# Patient Record
Sex: MALE | Race: WHITE | NOT HISPANIC OR LATINO
[De-identification: names, ages, dates, MRNs, and addresses within clinical notes are randomized per-mention and may not be internally consistent; named-entity substitution may affect disease eponyms.]

---

## 2021-12-16 ENCOUNTER — APPOINTMENT (OUTPATIENT)
Dept: FAMILY MEDICINE | Facility: CLINIC | Age: 56
End: 2021-12-16
Payer: COMMERCIAL

## 2021-12-16 ENCOUNTER — NON-APPOINTMENT (OUTPATIENT)
Age: 56
End: 2021-12-16

## 2021-12-16 VITALS
HEART RATE: 71 BPM | DIASTOLIC BLOOD PRESSURE: 110 MMHG | WEIGHT: 196 LBS | SYSTOLIC BLOOD PRESSURE: 190 MMHG | BODY MASS INDEX: 28.06 KG/M2 | TEMPERATURE: 97.6 F | HEIGHT: 70 IN | OXYGEN SATURATION: 97 %

## 2021-12-16 DIAGNOSIS — E55.9 VITAMIN D DEFICIENCY, UNSPECIFIED: ICD-10-CM

## 2021-12-16 PROCEDURE — 36415 COLL VENOUS BLD VENIPUNCTURE: CPT

## 2021-12-16 PROCEDURE — 99386 PREV VISIT NEW AGE 40-64: CPT | Mod: 25

## 2021-12-17 NOTE — HISTORY OF PRESENT ILLNESS
[de-identified] : 56-year-old male with history of hypertension, vitamin D, presents today to establish care as a new patient\par \par HTN 15 yrs ago - 2004 went into this in 2 months - under extreme stress \par - describes anxiety chest pains \par - 3rd degree - \par - intentional weight loss 13 pounds - triglycerides and increased uric  acid \par -  improved job situation \par noted to be job related stress \par patient has proven to have issues with \par nifedipine- went emergency for sob, pain up legs and change in coloration- further went up \par - diagnosed wit h anaphalaxis \par - multiple specialists were seen- patient unaware of \par \par -two years ago had stress ct cardiac noted ot be normal with Dr. daniel Marcos \par severe gout and tendonitis noted in the past\par 2018-

## 2021-12-17 NOTE — HEALTH RISK ASSESSMENT
[Very Good] : ~his/her~  mood as very good [Never] : Never [No] : No [Patient reported colonoscopy was normal] : Patient reported colonoscopy was normal [HIV test declined] : HIV test declined [Hepatitis C test declined] : Hepatitis C test declined [With Family] : lives with family [Employed] : employed [] :  [Sexually Active] : sexually active [ColonoscopyDate] : 2018 [FreeTextEntry2] :

## 2021-12-17 NOTE — PLAN
[FreeTextEntry1] : Resistant hypertension\par This patient has had an extensive work-up per history.  Also has had an extensive cardiac work-up\par Have recommended that the patient establish with cardiology for continued monitoring given the high blood pressure\par The patient at this time presents with lab values from his most recent evaluation by Dr. Lin which shows fairly normal lab values.\par See chart for details\par patient with appropriate preventative UTD \par no concerns on exam \par age appropriate counseling given\par

## 2021-12-27 LAB
25(OH)D3 SERPL-MCNC: 43.2 NG/ML
ALBUMIN SERPL ELPH-MCNC: 4.8 G/DL
ALP BLD-CCNC: 101 U/L
ALT SERPL-CCNC: 24 U/L
ANION GAP SERPL CALC-SCNC: 12 MMOL/L
AST SERPL-CCNC: 22 U/L
BASOPHILS # BLD AUTO: 0.06 K/UL
BASOPHILS NFR BLD AUTO: 1.2 %
BILIRUB SERPL-MCNC: 0.5 MG/DL
BUN SERPL-MCNC: 14 MG/DL
CALCIUM SERPL-MCNC: 9.7 MG/DL
CHLORIDE SERPL-SCNC: 104 MMOL/L
CO2 SERPL-SCNC: 28 MMOL/L
CORTICOSTEROID BIND GLOBULIN: 2.2 MG/DL
CORTIS SERPL-MCNC: 15 UG/DL
CORTISOL, FREE: 2.1 UG/DL
CREAT SERPL-MCNC: 1.02 MG/DL
EOSINOPHIL # BLD AUTO: 0.26 K/UL
EOSINOPHIL NFR BLD AUTO: 5.2 %
GLUCOSE SERPL-MCNC: 108 MG/DL
HCT VFR BLD CALC: 49.9 %
HGB BLD-MCNC: 16.8 G/DL
IMM GRANULOCYTES NFR BLD AUTO: 0.2 %
LYMPHOCYTES # BLD AUTO: 1.56 K/UL
LYMPHOCYTES NFR BLD AUTO: 31.3 %
MAN DIFF?: NORMAL
MCHC RBC-ENTMCNC: 31.1 PG
MCHC RBC-ENTMCNC: 33.7 GM/DL
MCV RBC AUTO: 92.2 FL
METANEPHRINE, PL: 13 PG/ML
MONOCYTES # BLD AUTO: 0.5 K/UL
MONOCYTES NFR BLD AUTO: 10 %
NEUTROPHILS # BLD AUTO: 2.59 K/UL
NEUTROPHILS NFR BLD AUTO: 52.1 %
NORMETANEPHRINE, PL: 207.5 PG/ML
PFCX: 14 %
PLATELET # BLD AUTO: 221 K/UL
POTASSIUM SERPL-SCNC: 3.9 MMOL/L
PROT SERPL-MCNC: 7.2 G/DL
RBC # BLD: 5.41 M/UL
RBC # FLD: 12.1 %
SODIUM SERPL-SCNC: 144 MMOL/L
TSH SERPL-ACNC: 1 UIU/ML
URATE SERPL-MCNC: 6.9 MG/DL
WBC # FLD AUTO: 4.98 K/UL

## 2022-01-07 ENCOUNTER — NON-APPOINTMENT (OUTPATIENT)
Age: 57
End: 2022-01-07

## 2022-04-07 ENCOUNTER — APPOINTMENT (OUTPATIENT)
Dept: FAMILY MEDICINE | Facility: CLINIC | Age: 57
End: 2022-04-07

## 2022-08-08 ENCOUNTER — TRANSCRIPTION ENCOUNTER (OUTPATIENT)
Age: 57
End: 2022-08-08

## 2022-08-09 ENCOUNTER — TRANSCRIPTION ENCOUNTER (OUTPATIENT)
Age: 57
End: 2022-08-09

## 2022-08-10 ENCOUNTER — NON-APPOINTMENT (OUTPATIENT)
Age: 57
End: 2022-08-10

## 2022-08-19 ENCOUNTER — APPOINTMENT (OUTPATIENT)
Dept: FAMILY MEDICINE | Facility: CLINIC | Age: 57
End: 2022-08-19

## 2022-08-22 ENCOUNTER — RESULT REVIEW (OUTPATIENT)
Age: 57
End: 2022-08-22

## 2022-08-22 ENCOUNTER — APPOINTMENT (OUTPATIENT)
Dept: FAMILY MEDICINE | Facility: CLINIC | Age: 57
End: 2022-08-22

## 2022-08-22 VITALS
OXYGEN SATURATION: 98 % | BODY MASS INDEX: 26.05 KG/M2 | DIASTOLIC BLOOD PRESSURE: 100 MMHG | HEART RATE: 82 BPM | WEIGHT: 182 LBS | HEIGHT: 70 IN | SYSTOLIC BLOOD PRESSURE: 168 MMHG | RESPIRATION RATE: 16 BRPM | TEMPERATURE: 97.5 F

## 2022-08-22 VITALS — SYSTOLIC BLOOD PRESSURE: 174 MMHG | DIASTOLIC BLOOD PRESSURE: 96 MMHG

## 2022-08-22 DIAGNOSIS — Z01.810 ENCOUNTER FOR PREPROCEDURAL CARDIOVASCULAR EXAMINATION: ICD-10-CM

## 2022-08-22 PROCEDURE — 99495 TRANSJ CARE MGMT MOD F2F 14D: CPT

## 2022-08-22 NOTE — HISTORY OF PRESENT ILLNESS
[Post-hospitalization from ___ Hospital] : Post-hospitalization from [unfilled] Hospital [Admitted on: ___] : The patient was admitted on [unfilled] [Discharge Summary] : discharge summary [Pertinent Labs] : pertinent labs [Radiology Findings] : radiology findings [Discharge Med List] : discharge medication list [Med Reconciliation] : medication reconciliation has been completed [Patient Contacted By: ____] : and contacted by [unfilled] [Discharged on ___] : discharged on [unfilled] [FreeTextEntry2] : 57-year-old male with a history of resistant hypertension and multiple allergies to hypertensive medications, nephrolithiasis, gout, presents today for hospital follow-up\par This patient was admitted on the above date due to left flank pain.  He was found to have left-sided hydronephrosis and hydroureter secondary to nephrolithiasis.  He was also found to have continued severe uncontrolled hypertension.\par He was admitted to the ICU due to his elevated blood pressure.  In the ICU he was not given any antihypertensive medications due to his multiple allergies.  His blood pressure through the night did lower.  The patient confirms that there is an anxiety component to his hypertension.\par He underwent a cystoscopy with retrograde pyelogram and had a left stent placement.  Upon discharge he was able to void freely and his blood pressure had improved upon discharge.\par tamsulosin was given to the patient on discharge\par \par Patient continues to have bleeding with frequency from stent \par \par Cardiology\par - patient did not go to cardiology as instructed at our last visit \par - no history of heart attack stroke \par -The patient's history of hypertension is a long period\par -He had a full evaluation from both nephrology and cardiology he had underwent cardiac cath per patient as well as stress test in our chart in 2015\par -He has undergone sleep study per patient which is negative.  He is undergone multiple attempts at medications which I will run the risk of causing anaphylaxis\par \par Pulmonary\par - Quit smoking 12 yrs ago \par - no h/o of asthma copd, \par - no history of sleep apnea \par \par Anesthesia \par - no history of poor reaction to anesthesia \par \par METS\par > 4 \par - regular Karate classes \par \par

## 2022-08-24 ENCOUNTER — TRANSCRIPTION ENCOUNTER (OUTPATIENT)
Age: 57
End: 2022-08-24

## 2022-08-24 LAB — BACTERIA UR CULT: NORMAL

## 2023-01-03 ENCOUNTER — APPOINTMENT (OUTPATIENT)
Age: 58
End: 2023-01-03
Payer: COMMERCIAL

## 2023-01-03 VITALS
TEMPERATURE: 97.8 F | OXYGEN SATURATION: 97 % | SYSTOLIC BLOOD PRESSURE: 180 MMHG | BODY MASS INDEX: 27.2 KG/M2 | HEART RATE: 72 BPM | HEIGHT: 70 IN | WEIGHT: 190 LBS | DIASTOLIC BLOOD PRESSURE: 100 MMHG

## 2023-01-03 DIAGNOSIS — H93.12 TINNITUS, LEFT EAR: ICD-10-CM

## 2023-01-03 PROCEDURE — 99214 OFFICE O/P EST MOD 30 MIN: CPT | Mod: 25

## 2023-01-03 PROCEDURE — 36415 COLL VENOUS BLD VENIPUNCTURE: CPT

## 2023-01-03 NOTE — ASSESSMENT
[FreeTextEntry1] : Tingling\par -Unclear etiology as the patient has tingling on multiple dispersed areas\par -Recommend checking for secondary causes TSH has ruled out thyroid would also check B12 B6 folate\par -If no result from these tests would consider a neuro eval\par \par Tinnitus\par -Recommended an ENT eval\par \par Resistant hypertension\par -Given the patient's long history of elevated blood pressure, with poor reactions to antihypertensive\par -Recommending a trial of SSRI\par -If this does not work may need to consider psychiatry eval\par -Patient was counseled on the risks benefits contraindications of the medication Lexapro

## 2023-01-03 NOTE — HISTORY OF PRESENT ILLNESS
[FreeTextEntry1] : follow up [de-identified] : 57 year of age M presents for follow up\par \par abnormal skin sensation- Patient describes tingling and crawling sensation around mouth and neck. Patient mentions living in an old house. Has had it occur on and off the past few years. Denies any pressure or pain around jaw. Denies any recent trauma or injuries around area. Denies any headaches or loss of vision. Sensation also occurs around scalp and legs and back. \par \par Hypertensive urgency-this patient has a longstanding history of poorly controlled hypertension with no secondary causes identified on work-up as noted by patient from nephrology cardiology.  The patient also notes good exercise tolerance no symptoms that appear to be related to blood pressure.  At this time he is noted that he was getting massages that seem to normalize his blood pressure.  These were done by a masseuse.  Leading to the association of this and anxiety.  His POLO-7 at this visit however is 0.  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 Also started suffering anxiety from job. Has no history of hospitalizations for anxiety or depression. When patient has anxiety attack, he breaks out in hives with chest discomfort/shortness of breath and elevated blood pressure.Patient denies alcohol use/illicit drug use or history of depression. Denies any jaw pain. Patient feels anxious very few times currently because job no longer as stressful. No difficulty concentrating or racing thoughts. Has good appetite. Anxiety attacks have significantly decreased in severity. \par \par tinnitus- does not affect hearing. Denies jaw pain. \par \par

## 2023-02-01 ENCOUNTER — APPOINTMENT (OUTPATIENT)
Dept: FAMILY MEDICINE | Facility: CLINIC | Age: 58
End: 2023-02-01
Payer: COMMERCIAL

## 2023-02-01 PROCEDURE — 99212 OFFICE O/P EST SF 10 MIN: CPT | Mod: 95

## 2023-02-01 NOTE — HISTORY OF PRESENT ILLNESS
[Home] : at home, [unfilled] , at the time of the visit. [Medical Office: ([facility])___] : at the medical office located in  [de-identified] : 57 yr old male with resistant htn with multiple medicaiton adverse reactions presetns with follow up of anxiety medicaitons \par \par Patient hasn’t had blood test as requested. \par Patient was wary of taking the medication because concerned about side effects so only took a dose yesterday and today which hasn’t effected him \par \par seeing neurology tomorrow for evaluation- \par Patient to be seeing ENT for ears \par \par Blood pressure at home has been stable per patient (for him) - exercising and meditation more so noting some mild improvement but hoping to see

## 2023-02-10 ENCOUNTER — LABORATORY RESULT (OUTPATIENT)
Age: 58
End: 2023-02-10

## 2023-02-16 ENCOUNTER — APPOINTMENT (OUTPATIENT)
Dept: NEUROLOGY | Facility: CLINIC | Age: 58
End: 2023-02-16
Payer: COMMERCIAL

## 2023-02-16 VITALS
DIASTOLIC BLOOD PRESSURE: 118 MMHG | HEART RATE: 80 BPM | BODY MASS INDEX: 26.48 KG/M2 | WEIGHT: 185 LBS | HEIGHT: 70 IN | SYSTOLIC BLOOD PRESSURE: 196 MMHG

## 2023-02-16 PROCEDURE — 99204 OFFICE O/P NEW MOD 45 MIN: CPT

## 2023-02-16 RX ORDER — CLONAZEPAM 0.5 MG/1
0.5 TABLET ORAL
Qty: 14 | Refills: 0 | Status: ACTIVE | COMMUNITY
Start: 2023-02-16 | End: 1900-01-01

## 2023-02-16 RX ORDER — ESCITALOPRAM OXALATE 5 MG/1
5 TABLET ORAL DAILY
Qty: 30 | Refills: 0 | Status: DISCONTINUED | COMMUNITY
Start: 2023-01-03 | End: 2023-02-16

## 2023-02-16 NOTE — ASSESSMENT
[FreeTextEntry1] : Please get labwork (may need to take labs first thing in the morning)\par Please follow-up with psychiatry\par \par Try [facility] online\par \par Please follow-up with endocrinology and cardiology\par \par Start klonopin 0.5 mg in the morning for two days, then increase the dose to 1 mg in the morning and continue at this dose \par \par Check blood pressure daily and record \par \par record blood pressure after you drink a glass of wine\par \par Telehealth visit in two weeks to assess response (permission to overbook)\par \par

## 2023-02-16 NOTE — DISCUSSION/SUMMARY
[FreeTextEntry1] : Eddie is a 57-year-old man with a history refractory hypertension with reported allergies/intolerance to numerous blood pressure medications. He presents with tingling but unfortunately with his blood pressure \par so high, the priority should be lowering it as this could be hypertensive emergency. \par \par Some component may be related to autonomic dysfunction/anxiety. Will do a trial of a klonopin - instructed him to check his blood pressure an hour or so after he takes it. Would also like him to check blood pressure an hour or so after he drinks wine. If benzodiazepines work, could try buspar - told him that I can prescribe these temporarily given urgency but he really needs to establish care with a psychiatrist. \par Will check lab work including cortisol, metanephrine, ACTH , TSH and will refer again to cardiology and endocrinology.\par \par A last possibility would be some form of dysautonomia  -could do skin biopsy, tilt table test, etc but first priority is lowering bp as he is at high risk of stroke. Counseled extensively that if he has sudden onset weakness, numbness, dysarthria, aphasia, he is to go to the emergency room immediately to rule out stroke.

## 2023-02-16 NOTE — PHYSICAL EXAM
[FreeTextEntry1] : Blood pressure: 233/134 in the left arm and 243/116 in the right arm, pulse was 94. \par Mental Status: knows the month, day and year. Able to calculate number of quarters in $1.50. No difficulty with serial sevens. Speech fluent \par CN: visual acuity, VFF, blink to confrontation \par III, IV, VI, PERRL, EOMI \par V sensation normal to light touch, pinprick\par VII normal squint vs resistance, normal smile, face symmetric \par VIII: normal hearing \par IX, X normal gag, symmetric palate, uvula raises midline \par XI normal shrug versus resistance and lateralization of head versus resistance \par XII tongue symmetric, normal strength, no tremor fasciculation \par Motor: full strength throughout \par Sensory: normal to LT and vibration\par Reflexes \par Brachioradialis, biceps, triceps, patella and ankles 2+ \par Plantar flexor response bilaterally \par Coordination: no dysmetria on FNF \par Gait and station : normal balance and gait, no ataxic movements, able to toe /heel/tandem ; negative romberg \par \par

## 2023-02-16 NOTE — HISTORY OF PRESENT ILLNESS
[FreeTextEntry1] : Eddie is a 57-year-old right-handed man presenting with tingling over multiple sites of his body. \par He is presenting for work-up for this but upon taking his vitals, it was noted that his blood pressure was very high. Blood pressure was 196/118. When repeated, it was 233/134 in the left arm and 243/116 in the right arm, pulse was 94. He reported that he was much more anxious and this was why blood pressure was very high. Prior to checking his blood pressure, he tried to do some deep breathing. Recommended that he go to the emergency room immediately but he states this is a long-standing\par problem and unfortunately he has had adverse reactions to numerous blood pressure medications. \par \par He reports that he gets red and has shooting pain, chest discomfort with BP medications. He has failed numerous including amlodipine, spironolactone, hctz and losartan. He tried escitalopram briefly but states it made his blood pressure high. \par \par Discussed with him that tingling throughout his body could be a sign of hypertensive emergency and this needs to be prioritized and fixed first for his health.He denies headaches or blurred vision. \par Recently had B12 checked, it was normal. CMP was WNL. \par \par As per chart notes, Eddie has long-standing hypertension. It started around 2005. 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 This lasted until 2015 when he had a series of\par stressful jobs. He now works remotely and is less stressed out but still has high blood pressure. He noticed that it did go down after a Swedish Massage but these are very expensive so he has not \par gone back. \par \par No secondary cause was identified by nephrology or cardiology. \par He tried numerous blood pressure medications and had allergies with all of them. \par \par He has a low sodium diet. He exercises regularly and meditates. He has a glass of wine daily. He has not checked his blood pressure after this. He denies being a type A personality. No psychiatrist. \par Not depressed but is anxious. \par \par \par Allergies:\par penicillin \par Many blood pressure medication: anaphylaxis, gets red, chest discomfort - palpitations \par \par Medications failed \par amlodipine\par spironolactone\par hctz\par losartan\par atenolol \par \par Medications: \par aspirin as needed \par \par Surgeries:\par kidney blockage - kidney stone, surgically removed \par \par Social History\par 3 D visualization artist\par no smoking, drinks a glass of wine daily, no drug use \par \par no children\par not a type A personality \par exercises regularly, meditates \par \par \par \par

## 2023-02-17 ENCOUNTER — APPOINTMENT (OUTPATIENT)
Dept: FAMILY MEDICINE | Facility: CLINIC | Age: 58
End: 2023-02-17
Payer: COMMERCIAL

## 2023-02-17 LAB
ACTH STIM CORTISOL BASELINE: 13.9 UG/DL
CORTIS SERPL-MCNC: 13.9 UG/DL
TSH SERPL-ACNC: 0.98 UIU/ML

## 2023-02-17 PROCEDURE — 99213 OFFICE O/P EST LOW 20 MIN: CPT | Mod: 95

## 2023-02-17 NOTE — HISTORY OF PRESENT ILLNESS
[Home] : at home, [unfilled] , at the time of the visit. [Medical Office: ([facility])___] : at the medical office located in  [Verbal consent obtained from patient] : the patient, [unfilled] [de-identified] : 57-year-old male presents today for follow-up of resistant hypertension which at this point the working diagnosis is anxiety\par \par Lexapro started to have a reaction for the patient. \par 3 days after the medication. \par Saw Dr. Butt and noted the clear anxiety trigger\par currently on Klonopin. \par Going to  prescription today \par again notes Swedish massage improved his overall symptoms greater than any other medication he has had\par

## 2023-02-17 NOTE — ASSESSMENT
[FreeTextEntry1] : Long discussion was had with the patient about the use of Klonopin\par Discussed the risk benefits of the medication is habit-forming nature and its appropriate use\par I recommended sticking to the .5 mg for a slightly longer period in order to maintain the blood pressure decrease in a stepwise fashion to avoid any complications associated with decreasing the blood pressure too quickly\par The patient does note that he was evaluated already with multiple specialties.\par Have encouraged the patient to seek psychiatry eval\par May take over the refills of Klonopin if necessary temporarily whether patient seek psychiatry\par Will reach out to neurology to touch base regarding this case as well

## 2023-03-02 LAB
METANEPHRINE, PL: NORMAL PG/ML
NORMETANEPHRINE, PL: NORMAL PG/ML

## 2023-04-14 ENCOUNTER — APPOINTMENT (OUTPATIENT)
Dept: NEUROLOGY | Facility: CLINIC | Age: 58
End: 2023-04-14
Payer: COMMERCIAL

## 2023-04-14 PROCEDURE — 99212 OFFICE O/P EST SF 10 MIN: CPT | Mod: 95

## 2023-04-14 NOTE — HISTORY OF PRESENT ILLNESS
[FreeTextEntry1] : Last seen in clinic on 2/16/23. Actually doing much better. Anxiety has disappeared. Was going through some stressful situations which have improved. Has follow-up with nephrologist. Blood pressures recorded have been 140/90. Could not tolerate lexapro. Was advised not to take clonazepam by a  ? because of concern it would drop blood pressure too much and cause a stroke. Overall doing well Has follow-up with nephrologist. \par _________________________\par 2/16/23 \par Eddie is a 57-year-old right-handed man presenting with tingling over multiple sites of his body. \par He is presenting for work-up for this but upon taking his vitals, it was noted that his blood pressure was very high. Blood pressure was 196/118. When repeated, it was 233/134 in the left arm and 243/116 in the right arm, pulse was 94. He reported that he was much more anxious and this was why blood pressure was very high. Prior to checking his blood pressure, he tried to do some deep breathing. Recommended that he go to the emergency room immediately but he states this is a long-standing\par problem and unfortunately he has had adverse reactions to numerous blood pressure medications. \par \par He reports that he gets red and has shooting pain, chest discomfort with BP medications. He has failed numerous including amlodipine, spironolactone, hctz and losartan. He tried escitalopram briefly but states it made his blood pressure high. \par \par Discussed with him that tingling throughout his body could be a sign of hypertensive emergency and this needs to be prioritized and fixed first for his health.He denies headaches or blurred vision. \par Recently had B12 checked, it was normal. CMP was WNL. \par \par As per chart notes, Eddie has long-standing hypertension. It started around 2005. 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 This lasted until 2015 when he had a series of\par stressful jobs. He now works remotely and is less stressed out but still has high blood pressure. He noticed that it did go down after a Swedish Massage but these are very expensive so he has not \par gone back. \par \par No secondary cause was identified by nephrology or cardiology. \par He tried numerous blood pressure medications and had allergies with all of them. \par \par He has a low sodium diet. He exercises regularly and meditates. He has a glass of wine daily. He has not checked his blood pressure after this. He denies being a type A personality. No psychiatrist. \par Not depressed but is anxious. 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 [Medical Office: ([facility])___] : at the medical office located in  [Verbal consent obtained from patient] : the patient, [unfilled]

## 2023-04-14 NOTE — DISCUSSION/SUMMARY
[FreeTextEntry1] : Eddie is a 57-year-old man with a history refractory hypertension with reported allergies/intolerance to numerous blood pressure medications. He presents with tingling but unfortunately with his blood pressure \par so high, the priority should be lowering it as this could be hypertensive emergency. \par \par On second visit, Eddie reports that he is much less anxious and his blood pressure has normalized.\par He did not try clonazepam and had an adverse reaction to lexapro. \par ACTH, cortisol, TSH, metanephrine, folate, CMP were WNL.  \par \par Will check lab work including cortisol, metanephrine, ACTH , TSH and will refer again to cardiology and endocrinology. Could try magnesium at home for anxiety/insomnia. \par

## 2023-04-14 NOTE — PHYSICAL EXAM
[FreeTextEntry1] : Telehealth Visit\par Alert oriented. Gives detailed history. Euthymic mood\par EOMI, face activates symmetrically\par

## 2023-04-14 NOTE — ASSESSMENT
[FreeTextEntry1] : Doing well, blood pressure has improved \par No longer anxious \par Continue meditation, exercise\par Could try magnesium oxide 400 mg at night to help with anxiety, insomnia \par RTC as needed

## 2023-06-26 ENCOUNTER — NON-APPOINTMENT (OUTPATIENT)
Age: 58
End: 2023-06-26

## 2023-06-26 ENCOUNTER — APPOINTMENT (OUTPATIENT)
Age: 58
End: 2023-06-26
Payer: COMMERCIAL

## 2023-06-26 VITALS
SYSTOLIC BLOOD PRESSURE: 190 MMHG | DIASTOLIC BLOOD PRESSURE: 110 MMHG | HEART RATE: 73 BPM | OXYGEN SATURATION: 96 % | HEIGHT: 70 IN

## 2023-06-26 PROCEDURE — 93000 ELECTROCARDIOGRAM COMPLETE: CPT

## 2023-06-26 PROCEDURE — 36415 COLL VENOUS BLD VENIPUNCTURE: CPT

## 2023-06-26 PROCEDURE — 99214 OFFICE O/P EST MOD 30 MIN: CPT | Mod: 25

## 2023-06-26 RX ORDER — FLUTICASONE PROPIONATE 50 UG/1
50 SPRAY, METERED NASAL
Qty: 1 | Refills: 1 | Status: ACTIVE | COMMUNITY
Start: 2023-06-26 | End: 1900-01-01

## 2023-06-26 RX ORDER — MONTELUKAST 10 MG/1
10 TABLET, FILM COATED ORAL
Qty: 30 | Refills: 1 | Status: ACTIVE | COMMUNITY
Start: 2023-06-26 | End: 1900-01-01

## 2023-06-26 NOTE — HISTORY OF PRESENT ILLNESS
[FreeTextEntry1] : follow up [de-identified] : 58 year of age M presents for follow up\par \par hypertension- elevated blood pressure at visit. Home blood pressure readings average 150 -140/90. \par \par ankle tendinitis- feels pain on ankle and dorsum of foot when he goes hiking. Located on right side. \par \par allergic rhinitis- has difficulty breathing when exposed to high pollen in air. Denies any chest pain or wheezing. Associated with sinus headaches and itchiness. Has allergies to tree mold. \par \par

## 2023-06-27 LAB
ALBUMIN SERPL ELPH-MCNC: 4.6 G/DL
ALP BLD-CCNC: 85 U/L
ALT SERPL-CCNC: 19 U/L
ANION GAP SERPL CALC-SCNC: 14 MMOL/L
AST SERPL-CCNC: 18 U/L
BILIRUB SERPL-MCNC: 0.6 MG/DL
BUN SERPL-MCNC: 16 MG/DL
CALCIUM SERPL-MCNC: 9.5 MG/DL
CHLORIDE SERPL-SCNC: 103 MMOL/L
CHOLEST SERPL-MCNC: 165 MG/DL
CO2 SERPL-SCNC: 25 MMOL/L
CREAT SERPL-MCNC: 1.15 MG/DL
EGFR: 73 ML/MIN/1.73M2
GLUCOSE SERPL-MCNC: 126 MG/DL
HDLC SERPL-MCNC: 50 MG/DL
LDLC SERPL CALC-MCNC: 69 MG/DL
NONHDLC SERPL-MCNC: 115 MG/DL
POTASSIUM SERPL-SCNC: 3.9 MMOL/L
PROT SERPL-MCNC: 7 G/DL
SODIUM SERPL-SCNC: 142 MMOL/L
TRIGL SERPL-MCNC: 227 MG/DL

## 2023-07-14 ENCOUNTER — APPOINTMENT (OUTPATIENT)
Dept: ENDOCRINOLOGY | Facility: CLINIC | Age: 58
End: 2023-07-14

## 2023-08-02 ENCOUNTER — APPOINTMENT (OUTPATIENT)
Age: 58
End: 2023-08-02
Payer: COMMERCIAL

## 2023-08-02 VITALS
SYSTOLIC BLOOD PRESSURE: 140 MMHG | OXYGEN SATURATION: 98 % | TEMPERATURE: 98.1 F | HEART RATE: 78 BPM | WEIGHT: 186 LBS | HEIGHT: 70 IN | DIASTOLIC BLOOD PRESSURE: 80 MMHG | BODY MASS INDEX: 26.63 KG/M2

## 2023-08-02 PROCEDURE — 36415 COLL VENOUS BLD VENIPUNCTURE: CPT

## 2023-08-02 PROCEDURE — 99214 OFFICE O/P EST MOD 30 MIN: CPT | Mod: 25

## 2023-08-02 NOTE — HISTORY OF PRESENT ILLNESS
[FreeTextEntry1] : follow up. [de-identified] : 58 year of age M presents for follow up.   right foot swelling/pain- rates pain 5/10. Pain jumps from one joint to another joint. Swelling started after taking aspirin. Patient has a history of taking martial arts. Mowing lawn triggers pain. Walking on uneven surfaces triggers pain. Worse during summer. Denies any numbness or tingling on foot. Originally had episodes occurring on left foot. Feels like there is a knot inside foot.

## 2023-08-02 NOTE — PHYSICAL EXAM
[No Respiratory Distress] : no respiratory distress  [Normal Rate] : normal rate  [de-identified] : tender 2nd tarsal at base near MTP with no neorvascular impairment ; full rom

## 2023-08-03 ENCOUNTER — RESULT REVIEW (OUTPATIENT)
Age: 58
End: 2023-08-03

## 2023-08-03 LAB
CRP SERPL-MCNC: 7 MG/L
ERYTHROCYTE [SEDIMENTATION RATE] IN BLOOD BY WESTERGREN METHOD: 11 MM/HR
RHEUMATOID FACT SER QL: <10 IU/ML
URATE SERPL-MCNC: 8.1 MG/DL

## 2023-08-04 LAB — ANA SER IF-ACNC: NEGATIVE

## 2023-08-16 ENCOUNTER — APPOINTMENT (OUTPATIENT)
Dept: PODIATRY | Facility: CLINIC | Age: 58
End: 2023-08-16
Payer: COMMERCIAL

## 2023-08-16 VITALS — WEIGHT: 186 LBS | HEIGHT: 70 IN | BODY MASS INDEX: 26.63 KG/M2

## 2023-08-16 DIAGNOSIS — M76.821 POSTERIOR TIBIAL TENDINITIS, RIGHT LEG: ICD-10-CM

## 2023-08-16 DIAGNOSIS — Q74.2 OTHER CONGENITAL MALFORMATIONS OF LOWER LIMB(S), INCLUDING PELVIC GIRDLE: ICD-10-CM

## 2023-08-16 PROCEDURE — 99203 OFFICE O/P NEW LOW 30 MIN: CPT

## 2023-08-16 NOTE — REASON FOR VISIT
[Initial Visit] : an initial visit for [Foot Deformity] : foot deformity [Foot Pain] : foot pain [FreeTextEntry2] : right foot

## 2023-08-16 NOTE — PHYSICAL EXAM
[General Appearance - Alert] : alert [General Appearance - In No Acute Distress] : in no acute distress [Skin Turgor] : normal skin turgor [Skin Color & Pigmentation] : normal skin color and pigmentation [] : no rash [Skin Lesions] : no skin lesions [Sensation] : the sensory exam was normal to light touch and pinprick [No Focal Deficits] : no focal deficits [Deep Tendon Reflexes (DTR)] : deep tendon reflexes were 2+ and symmetric [Motor Exam] : the motor exam was normal [Oriented To Time, Place, And Person] : oriented to person, place, and time [Impaired Insight] : insight and judgment were intact [Affect] : the affect was normal [FreeTextEntry3] : Vascular exam reveals palpable pedal pulses, the foot is warm to touch, there was good capillary fill time, the skin is normal in appearance there is no evidence of vascular disease or compromise at this time [de-identified] : patient does have s and sx c/w PT tendonitis, accessory navicular and hallux limitus on right side [Foot Ulcer] : no foot ulcer [Skin Induration] : no skin induration

## 2023-08-16 NOTE — HISTORY OF PRESENT ILLNESS
[FreeTextEntry1] : Location: great toe joint, medial ankle Duration: 6 years on and off Etiology: unknown  Past Tx:  nsaids Exacerbated by: Prior Hx: on the left foot hx of gout

## 2023-08-16 NOTE — PROCEDURE
[FreeTextEntry1] : Aside from the clinical presentation of a hallux limitus on the right side a mild hallux valgus on the left side prominent accessory navicular and what does appear to be signs and symptoms of posterior tibial tendinitis which resolved spontaneously and possible acute gouty arthropathy of the distal lesser metatarsal phalangeal joints 2 3 and 4 on the right foot the patient has no acute presenting symptoms at this time I did review recent x-rays that were taken here at the emergency department and it did confirm that the patient does have an accessory navicular as well as the aforementioned spurring at the level of the great toe joint on the right foot. I have reviewed the xrays that were taken outside this office and discussed my findings, opinions and recommendations to the patient There the classic signs and symptoms of hallux limitus with dorsal spurring noted at the great toe joint both on the base of the proximal phalanx and the first metatarsal head. These are classic. There is crepitus, jamming, pain and associated extensor longus tendon tendinitis. A gait exam was performed and there appears to be program and supination approaching mid stance as the patient admits to referred pain to the lateral aspect of the foot due to an inability to dorsiflex the great toe joint. a complete and verbal discussion with the patient regarding diagnosis etiology and treatment for gout was reviewed. Education on treatment options were reviewed. Treatment can consist of anti-inflammatory medications, local steroid injection, or medication taken internally and coordinated with the primary care physician. Educational literature on a low purine diet was discussed and dispensed and prevention was stressed. 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 There is a prominent navicular with pain associated with posterior tibial tendon dysfunction as is normally seen in patients that are active and to have a flexible flatfoot deformity. During the evaluation and management I had a lengthy discussion with the patient regarding benefits of functional foot orthoses. I explained to the patient the etiology and treatment options and one of them included the offloading and balancing of the painful portion of the foot. I explained the importance of balancing in offloading the painful area as part of the overall treatment process to advance healing. I have asked the patient to consider this as part of the treatment follow up prn

## 2023-12-07 ENCOUNTER — APPOINTMENT (OUTPATIENT)
Age: 58
End: 2023-12-07
Payer: COMMERCIAL

## 2023-12-07 ENCOUNTER — NON-APPOINTMENT (OUTPATIENT)
Age: 58
End: 2023-12-07

## 2023-12-07 VITALS
DIASTOLIC BLOOD PRESSURE: 140 MMHG | HEIGHT: 70 IN | OXYGEN SATURATION: 98 % | WEIGHT: 201 LBS | SYSTOLIC BLOOD PRESSURE: 220 MMHG | BODY MASS INDEX: 28.77 KG/M2 | HEART RATE: 79 BPM

## 2023-12-07 DIAGNOSIS — R20.2 PARESTHESIA OF SKIN: ICD-10-CM

## 2023-12-07 DIAGNOSIS — Z87.898 PERSONAL HISTORY OF OTHER SPECIFIED CONDITIONS: ICD-10-CM

## 2023-12-07 DIAGNOSIS — M20.5X1 OTHER DEFORMITIES OF TOE(S) (ACQUIRED), RIGHT FOOT: ICD-10-CM

## 2023-12-07 DIAGNOSIS — F41.9 ANXIETY DISORDER, UNSPECIFIED: ICD-10-CM

## 2023-12-07 DIAGNOSIS — M79.671 PAIN IN RIGHT FOOT: ICD-10-CM

## 2023-12-07 DIAGNOSIS — N20.0 CALCULUS OF KIDNEY: ICD-10-CM

## 2023-12-07 DIAGNOSIS — M10.9 GOUT, UNSPECIFIED: ICD-10-CM

## 2023-12-07 PROCEDURE — 99396 PREV VISIT EST AGE 40-64: CPT | Mod: 25

## 2023-12-07 PROCEDURE — 93000 ELECTROCARDIOGRAM COMPLETE: CPT

## 2023-12-07 PROCEDURE — 36415 COLL VENOUS BLD VENIPUNCTURE: CPT

## 2023-12-08 LAB
ALBUMIN SERPL ELPH-MCNC: 4.8 G/DL
ALP BLD-CCNC: 82 U/L
ALT SERPL-CCNC: 24 U/L
ANION GAP SERPL CALC-SCNC: 13 MMOL/L
AST SERPL-CCNC: 18 U/L
BASOPHILS # BLD AUTO: 0.07 K/UL
BASOPHILS NFR BLD AUTO: 1.5 %
BILIRUB SERPL-MCNC: 0.8 MG/DL
BUN SERPL-MCNC: 15 MG/DL
CALCIUM SERPL-MCNC: 9.8 MG/DL
CHLORIDE SERPL-SCNC: 104 MMOL/L
CHOLEST SERPL-MCNC: 174 MG/DL
CO2 SERPL-SCNC: 27 MMOL/L
CREAT SERPL-MCNC: 0.99 MG/DL
CREAT SPEC-SCNC: 126 MG/DL
EGFR: 87 ML/MIN/1.73M2
EOSINOPHIL # BLD AUTO: 0.15 K/UL
EOSINOPHIL NFR BLD AUTO: 3.1 %
GLUCOSE SERPL-MCNC: 82 MG/DL
HCT VFR BLD CALC: 50 %
HDLC SERPL-MCNC: 46 MG/DL
HGB BLD-MCNC: 17.2 G/DL
IMM GRANULOCYTES NFR BLD AUTO: 0.4 %
LDLC SERPL CALC-MCNC: 99 MG/DL
LYMPHOCYTES # BLD AUTO: 1.37 K/UL
LYMPHOCYTES NFR BLD AUTO: 28.5 %
MAN DIFF?: NORMAL
MCHC RBC-ENTMCNC: 31.4 PG
MCHC RBC-ENTMCNC: 34.4 GM/DL
MCV RBC AUTO: 91.4 FL
MICROALBUMIN 24H UR DL<=1MG/L-MCNC: <1.2 MG/DL
MICROALBUMIN/CREAT 24H UR-RTO: NORMAL MG/G
MONOCYTES # BLD AUTO: 0.42 K/UL
MONOCYTES NFR BLD AUTO: 8.7 %
NEUTROPHILS # BLD AUTO: 2.78 K/UL
NEUTROPHILS NFR BLD AUTO: 57.8 %
NONHDLC SERPL-MCNC: 128 MG/DL
PLATELET # BLD AUTO: 221 K/UL
POTASSIUM SERPL-SCNC: 4.2 MMOL/L
PROT SERPL-MCNC: 6.9 G/DL
RBC # BLD: 5.47 M/UL
RBC # FLD: 12.5 %
SODIUM SERPL-SCNC: 144 MMOL/L
TRIGL SERPL-MCNC: 168 MG/DL
WBC # FLD AUTO: 4.81 K/UL

## 2024-04-01 ENCOUNTER — APPOINTMENT (OUTPATIENT)
Dept: FAMILY MEDICINE | Facility: CLINIC | Age: 59
End: 2024-04-01
Payer: COMMERCIAL

## 2024-04-01 VITALS
HEIGHT: 70 IN | HEART RATE: 80 BPM | WEIGHT: 201 LBS | BODY MASS INDEX: 28.77 KG/M2 | SYSTOLIC BLOOD PRESSURE: 240 MMHG | DIASTOLIC BLOOD PRESSURE: 120 MMHG | OXYGEN SATURATION: 98 %

## 2024-04-01 VITALS — SYSTOLIC BLOOD PRESSURE: 200 MMHG | DIASTOLIC BLOOD PRESSURE: 120 MMHG

## 2024-04-01 DIAGNOSIS — R39.89 OTHER SYMPTOMS AND SIGNS INVOLVING THE GENITOURINARY SYSTEM: ICD-10-CM

## 2024-04-01 DIAGNOSIS — D58.2 OTHER HEMOGLOBINOPATHIES: ICD-10-CM

## 2024-04-01 PROCEDURE — 99214 OFFICE O/P EST MOD 30 MIN: CPT

## 2024-04-01 PROCEDURE — G2211 COMPLEX E/M VISIT ADD ON: CPT

## 2024-04-01 PROCEDURE — 36415 COLL VENOUS BLD VENIPUNCTURE: CPT

## 2024-04-02 LAB
ANION GAP SERPL CALC-SCNC: 12 MMOL/L
APPEARANCE: CLEAR
BACTERIA: NEGATIVE /HPF
BASOPHILS # BLD AUTO: 0.11 K/UL
BASOPHILS NFR BLD AUTO: 1.7 %
BILIRUBIN URINE: NEGATIVE
BLOOD URINE: NEGATIVE
BUN SERPL-MCNC: 13 MG/DL
CALCIUM SERPL-MCNC: 9.7 MG/DL
CAST: 0 /LPF
CHLORIDE SERPL-SCNC: 104 MMOL/L
CO2 SERPL-SCNC: 25 MMOL/L
COLOR: YELLOW
CREAT SERPL-MCNC: 1.05 MG/DL
EGFR: 81 ML/MIN/1.73M2
EOSINOPHIL # BLD AUTO: 0.19 K/UL
EOSINOPHIL NFR BLD AUTO: 2.9 %
EPITHELIAL CELLS: 0 /HPF
GLUCOSE QUALITATIVE U: NEGATIVE MG/DL
GLUCOSE SERPL-MCNC: 100 MG/DL
HCT VFR BLD CALC: 48.1 %
HGB BLD-MCNC: 16.7 G/DL
IMM GRANULOCYTES NFR BLD AUTO: 0.3 %
KETONES URINE: NEGATIVE MG/DL
LEUKOCYTE ESTERASE URINE: NEGATIVE
LYMPHOCYTES # BLD AUTO: 1.79 K/UL
LYMPHOCYTES NFR BLD AUTO: 27.3 %
MAN DIFF?: NORMAL
MCHC RBC-ENTMCNC: 30.4 PG
MCHC RBC-ENTMCNC: 34.7 GM/DL
MCV RBC AUTO: 87.6 FL
MICROSCOPIC-UA: NORMAL
MONOCYTES # BLD AUTO: 0.56 K/UL
MONOCYTES NFR BLD AUTO: 8.5 %
NEUTROPHILS # BLD AUTO: 3.89 K/UL
NEUTROPHILS NFR BLD AUTO: 59.3 %
NITRITE URINE: NEGATIVE
PH URINE: 6
PLATELET # BLD AUTO: 261 K/UL
POTASSIUM SERPL-SCNC: 4.1 MMOL/L
PROTEIN URINE: NEGATIVE MG/DL
PSA FREE FLD-MCNC: 41 %
PSA FREE SERPL-MCNC: 0.3 NG/ML
PSA SERPL-MCNC: 0.73 NG/ML
RBC # BLD: 5.49 M/UL
RBC # FLD: 11.9 %
RED BLOOD CELLS URINE: 0 /HPF
SODIUM SERPL-SCNC: 141 MMOL/L
SPECIFIC GRAVITY URINE: 1.01
UROBILINOGEN URINE: 0.2 MG/DL
WBC # FLD AUTO: 6.56 K/UL
WHITE BLOOD CELLS URINE: 0 /HPF

## 2024-04-03 LAB
BACTERIA UR CULT: NORMAL
EPO SERPL-MCNC: 9.5 MIU/ML

## 2024-04-03 NOTE — ASSESSMENT
[FreeTextEntry1] : Unclear etiology to pelvic pain Recommending that the patient be evaluated PSA, urinalysis, renal function Recommending that the patient have an ultrasound bladder kidney if no resolution or signs found on initial studies In the meantime monitor there is no sign of retention based off the history but given the pressure-like sensation the concern is retention may be developing  Patient is aware to see emergency room if there is any sudden retention  Hypertensive urgency Patient has a long history of difficult to control blood pressure and difficulty with side effects from medications Have counseled the patient extensively about signs symptoms of hypertensive emergency The patient at this time is recommended to consider sleep study as well as remote monitoring in order to make sure that we do not have a diagnosis of Sleep apnea or significant whitecoat hypertension as he notes his home blood pressures seem to be normal

## 2024-04-03 NOTE — HISTORY OF PRESENT ILLNESS
[FreeTextEntry8] : 58-year-old male with a history of hypertensive urgency and resistant hypertension presents today for acute exam pressure like pain in pelvis need to use bathroom perpetually  no urine color change no burning urine  no fever nausea or vomiting  no caffein use- only half a cup in the morning  no blood in ejaculate

## 2024-04-04 LAB
TESTOST FREE SERPL-MCNC: 5.9 PG/ML
TESTOST SERPL-MCNC: 238 NG/DL

## 2024-04-09 LAB
JAK2 P.V617F BLD/T QL: NORMAL
REFLEX:: NORMAL

## 2024-06-07 ENCOUNTER — APPOINTMENT (OUTPATIENT)
Age: 59
End: 2024-06-07
Payer: COMMERCIAL

## 2024-06-07 ENCOUNTER — NON-APPOINTMENT (OUTPATIENT)
Age: 59
End: 2024-06-07

## 2024-06-07 VITALS — DIASTOLIC BLOOD PRESSURE: 110 MMHG | SYSTOLIC BLOOD PRESSURE: 180 MMHG

## 2024-06-07 VITALS
WEIGHT: 201 LBS | HEIGHT: 70 IN | DIASTOLIC BLOOD PRESSURE: 150 MMHG | HEART RATE: 74 BPM | OXYGEN SATURATION: 98 % | SYSTOLIC BLOOD PRESSURE: 220 MMHG | BODY MASS INDEX: 28.77 KG/M2

## 2024-06-07 DIAGNOSIS — T78.40XA ALLERGY, UNSPECIFIED, INITIAL ENCOUNTER: ICD-10-CM

## 2024-06-07 DIAGNOSIS — Z00.00 ENCOUNTER FOR GENERAL ADULT MEDICAL EXAMINATION W/OUT ABNORMAL FINDINGS: ICD-10-CM

## 2024-06-07 DIAGNOSIS — I1A.0 RESISTANT HYPERTENSION: ICD-10-CM

## 2024-06-07 DIAGNOSIS — I10 ESSENTIAL (PRIMARY) HYPERTENSION: ICD-10-CM

## 2024-06-07 PROCEDURE — 99214 OFFICE O/P EST MOD 30 MIN: CPT

## 2024-06-07 PROCEDURE — 36415 COLL VENOUS BLD VENIPUNCTURE: CPT

## 2024-06-07 PROCEDURE — 93000 ELECTROCARDIOGRAM COMPLETE: CPT

## 2024-06-07 RX ORDER — MONTELUKAST 10 MG/1
10 TABLET, FILM COATED ORAL
Qty: 30 | Refills: 1 | Status: ACTIVE | COMMUNITY
Start: 2024-06-07 | End: 1900-01-01

## 2024-06-07 NOTE — ASSESSMENT
[FreeTextEntry1] : Allergy/shortness of breath with outdoor activity Patient with a history of asthma History of allergy symptoms Suspect a component of reactive airway Mixed with a seasonal allergy component Recommending a trial of montelukast If no resolution from these due to mild symptoms would consider pulmonary function testing as well as a controller agent/albuterol Want to avoid the albuterol at this moment given the history of blood pressure  Resistant hypertension Patient again unable to tolerate most medications/all medications provided He has seen cardiology in the past and had a significant workup from 2 different cardiologist regarding his blood pressure There is no clear resolution and has had a full workup for resistant hypertension Given his difficult to control blood pressure have recommended the blood pressure monitoring system remote, however he was not able to connect with them at our last attempt Recommending that the patient consider an eval with nephrology as well for evaluation and assistance with control of his blood pressure EKG performed today within normal limits Patient has no significant chest pain and no acute shortness of breath apart from when doing outdoor activities   Hand injury Patient had trauma of the hand during a sport.  Today he has full range of motion of his hand he has no significant discoloration or edema.  Neurovascularly intact Recommending that the patient monitor and use conservative management

## 2024-06-07 NOTE — HISTORY OF PRESENT ILLNESS
[FreeTextEntry8] : 59-year-old male with a history of allergies, asthma as a child, hypertension/resistant hypertension presenting today due to concern for allergies [place] noting that he has had difficulty with outdoor activities he notes some sense of sob with " sand paper" sensation of the lung  feels lung capacity is fine. able to participate in singing and activities such as martial arts without issue  patient during those periods of time took allegra  notes it did help has some coughing and congestion noted  note post nasal drip  had covid 1 week ago that seemed  to worsen symptoms only slightly   had a battery of tests with allergist  allergy to tree mold and has a history of allergy testing with ENT   Patient 2 weeks ago punched a knee of an opponent in martial arts.  At that time he had swelling of the hand.  He is conservative management and his symptoms likely resolved had some mild discomfort of the thumb and pointer finger region but otherwise has resolved

## 2024-07-05 ENCOUNTER — APPOINTMENT (OUTPATIENT)
Age: 59
End: 2024-07-05
Payer: COMMERCIAL

## 2024-07-05 DIAGNOSIS — Z00.00 ENCOUNTER FOR GENERAL ADULT MEDICAL EXAMINATION W/OUT ABNORMAL FINDINGS: ICD-10-CM

## 2024-07-05 DIAGNOSIS — I10 ESSENTIAL (PRIMARY) HYPERTENSION: ICD-10-CM

## 2024-07-05 DIAGNOSIS — T78.40XA ALLERGY, UNSPECIFIED, INITIAL ENCOUNTER: ICD-10-CM

## 2024-07-05 PROCEDURE — G2211 COMPLEX E/M VISIT ADD ON: CPT

## 2024-07-05 PROCEDURE — 99212 OFFICE O/P EST SF 10 MIN: CPT

## 2024-07-24 ENCOUNTER — APPOINTMENT (OUTPATIENT)
Dept: NEPHROLOGY | Facility: CLINIC | Age: 59
End: 2024-07-24
Payer: COMMERCIAL

## 2024-07-24 VITALS
BODY MASS INDEX: 28.63 KG/M2 | SYSTOLIC BLOOD PRESSURE: 230 MMHG | HEIGHT: 70 IN | OXYGEN SATURATION: 98 % | HEART RATE: 79 BPM | DIASTOLIC BLOOD PRESSURE: 120 MMHG | WEIGHT: 200 LBS

## 2024-07-24 DIAGNOSIS — F41.9 ANXIETY DISORDER, UNSPECIFIED: ICD-10-CM

## 2024-07-24 DIAGNOSIS — I1A.0 RESISTANT HYPERTENSION: ICD-10-CM

## 2024-07-24 DIAGNOSIS — I10 ESSENTIAL (PRIMARY) HYPERTENSION: ICD-10-CM

## 2024-07-24 PROCEDURE — 99205 OFFICE O/P NEW HI 60 MIN: CPT

## 2024-07-24 NOTE — REVIEW OF SYSTEMS
[Fever] : no fever [Chills] : no chills [Chest Pain] : no chest pain [Palpitations] : no palpitations [Cough] : no cough [SOB on Exertion] : no shortness of breath during exertion [Constipation] : no constipation [Diarrhea] : no diarrhea [Dysuria] : no dysuria [Incontinence] : no incontinence [Hesitancy] : no urinary hesitancy [Dizziness] : no dizziness [de-identified] : headaches

## 2024-07-24 NOTE — HISTORY OF PRESENT ILLNESS
[FreeTextEntry1] : -Has hx of HTN, kidney stones s//p removal, anxiety. He was diagnosed with HTN in 2005 then uncontrolled since 2016.  He had seen nephrologist before in 2016 in [place], stated that work up was not conclusive.  He also had cardiology evaluation BP today 230/120, he is not on any medication for the past 3 years.  BP has always been high since 2016.Lowest /92, highest 247/140. Denies any symptoms.  -Reported lowest home bp 170s/100s. He reported side effect with most of ani hypertensive classes. Anaphylactic reaction (described as palpitation, high BP, generalized cold sensation/pain/tingling, shooting pain in the arm, weird sensation in the chest, turn red) with: minoxidil, lisinopril, losartan, clonidine, metoprolol, atenolol.  Cough: 8 months after starting chlorthalidone HCTZ: gout Nifedipine: palpitation, sob Diltiazem: dizziness, sob He also tried non-medical management, with healthy diet, paul chi, acupuncture with minimal and transient improved in blood pressure. He reported healthy diet, half vegetarian diet, low salt diet. He very active, 4 times a week martial art and meditate regularly.  No use of NSAID. currently not on any medications. Lab reviewed, has polycythemia, JAK2 negative, Epo wnl. Snore at night,  Renal US last year    COMPARISON: 9/15/2022    FINDINGS:    Right kidney: 12.6 cm. No hydronephrosis or calculi. 2.3 cm interpolar cyst.    Left kidney: 13.1 cm. No hydronephrosis or calculi.     Urinary bladder: Within normal limits.

## 2024-07-24 NOTE — HISTORY OF PRESENT ILLNESS
[FreeTextEntry1] : -Has hx of HTN, kidney stones s//p removal, anxiety. He was diagnosed with HTN in 2005 then uncontrolled since 2016.  He had seen nephrologist before in 2016 in [place], stated that work up was not conclusive.  He also had cardiology evaluation BP today 230/120, he is not on any medication for the past 3 years.  BP has always been high since 2016.Lowest /92, highest 247/140. Denies any symptoms.  -Reported lowest home bp 170s/100s. He reported side effect with most of ani hypertensive classes. Anaphylactic reaction (described as palpitation, high BP, generalized cold sensation/pain/tingling, shooting pain in the arm, weird sensation in the chest, turn red) with: minoxidil, lisinopril, losartan, clonidine, metoprolol, atenolol.  Cough: 8 months after starting chlorthalidone HCTZ: gout Nifedipine: palpitation, sob Diltiazem: dizziness, sob He also tried non-medical management, with healthy diet, paul chi, acupuncture with minimal and transient improved in blood pressure. He reported healthy diet, half vegetarian diet, low salt diet. He very active, 4 times a week martial art and meditate regularly.  No use of NSAID. currently not on any medications. Lab reviewed, has polycythemia, JAK2 negative, Epo wnl. Snore at night,  Renal US last year    COMPARISON: 9/15/2022    FINDINGS:    Right kidney: 12.6 cm. No hydronephrosis or calculi. 2.3 cm interpolar cyst.    Left kidney: 13.1 cm. No hydronephrosis or calculi.     Urinary bladder: Within normal limits.

## 2024-07-24 NOTE — REVIEW OF SYSTEMS
[Fever] : no fever [Chills] : no chills [Chest Pain] : no chest pain [Palpitations] : no palpitations [Cough] : no cough [SOB on Exertion] : no shortness of breath during exertion [Constipation] : no constipation [Diarrhea] : no diarrhea [Dysuria] : no dysuria [Incontinence] : no incontinence [Hesitancy] : no urinary hesitancy [Dizziness] : no dizziness [de-identified] : headaches

## 2024-07-24 NOTE — ASSESSMENT
[FreeTextEntry1] : # HTN, uncontrolled -/120 in the office, asymptomatic, decline treatment. Risk and benefit explained, verbalized understanding. Reported Home BP reading, lowest /92, highest 247/140 -Not on medication for 3 years due to side effect with most of the anti-hypertensive class -Offered to try different med, starting low dose in the class that he had the side effect but declined medication. Agreed with further work up. -Will obtain secondary HTN work up with plasma metanephrine/catecholamin, 24 hr urine metanephrine and -catecholamin, AM cortisol, aldosterone, renin, acth.  - bmp,ua, upcr Low salt diet  # Anxiety  -Reported saw neurology in the past. Recommended psychiatrist   # Polycytemia -neg EMILI 2  -epo wnl -Refer to Pulmonary for ZAFAR eval  RTC 4weeks

## 2024-07-26 LAB
ALBUMIN SERPL ELPH-MCNC: 4.8 G/DL
ALDOSTERONE SERUM: 13.4 NG/DL
ALP BLD-CCNC: 92 U/L
ALT SERPL-CCNC: 24 U/L
ANION GAP SERPL CALC-SCNC: 14 MMOL/L
APPEARANCE: CLEAR
AST SERPL-CCNC: 19 U/L
BACTERIA: NEGATIVE /HPF
BASOPHILS # BLD AUTO: 0.06 K/UL
BASOPHILS NFR BLD AUTO: 1 %
BILIRUB SERPL-MCNC: 0.4 MG/DL
BILIRUBIN URINE: NEGATIVE
BLOOD URINE: NEGATIVE
BUN SERPL-MCNC: 16 MG/DL
CALCIUM SERPL-MCNC: 9.9 MG/DL
CAST: 0 /LPF
CHLORIDE SERPL-SCNC: 102 MMOL/L
CO2 SERPL-SCNC: 22 MMOL/L
COLOR: YELLOW
CORTIS SERPL-MCNC: 8.2 UG/DL
CREAT SERPL-MCNC: 1.04 MG/DL
EGFR: 82 ML/MIN/1.73M2
EOSINOPHIL # BLD AUTO: 0.34 K/UL
EOSINOPHIL NFR BLD AUTO: 5.9 %
EPITHELIAL CELLS: 0 /HPF
GLUCOSE QUALITATIVE U: NEGATIVE MG/DL
GLUCOSE SERPL-MCNC: 116 MG/DL
HCT VFR BLD CALC: 50.3 %
HGB BLD-MCNC: 17.2 G/DL
IMM GRANULOCYTES NFR BLD AUTO: 0.3 %
KETONES URINE: NEGATIVE MG/DL
LEUKOCYTE ESTERASE URINE: NEGATIVE
LYMPHOCYTES # BLD AUTO: 1.51 K/UL
LYMPHOCYTES NFR BLD AUTO: 26.4 %
MAN DIFF?: NORMAL
MCHC RBC-ENTMCNC: 31 PG
MCHC RBC-ENTMCNC: 34.2 GM/DL
MCV RBC AUTO: 90.8 FL
MICROSCOPIC-UA: NORMAL
MONOCYTES # BLD AUTO: 0.47 K/UL
MONOCYTES NFR BLD AUTO: 8.2 %
NEUTROPHILS # BLD AUTO: 3.33 K/UL
NEUTROPHILS NFR BLD AUTO: 58.2 %
NITRITE URINE: NEGATIVE
PH URINE: 5.5
PLATELET # BLD AUTO: 220 K/UL
POTASSIUM SERPL-SCNC: 3.9 MMOL/L
PROT SERPL-MCNC: 7.1 G/DL
PROTEIN URINE: NEGATIVE MG/DL
RBC # BLD: 5.54 M/UL
RBC # FLD: 12.6 %
RED BLOOD CELLS URINE: 0 /HPF
SODIUM SERPL-SCNC: 139 MMOL/L
SPECIFIC GRAVITY URINE: 1.01
TSH SERPL-ACNC: 1.06 UIU/ML
UROBILINOGEN URINE: 0.2 MG/DL
WBC # FLD AUTO: 5.73 K/UL
WHITE BLOOD CELLS URINE: 0 /HPF

## 2024-08-02 LAB
ACTH-ESO: 16 PG/ML
DOPAMINE UR-MCNC: <30 PG/ML
EPINEPH UR-MCNC: <15 PG/ML
METANEPHRINE, PL: <25 PG/ML
NOREPINEPH UR-MCNC: 35 PG/ML
NORMETANEPHRINE, PL: 150.9 PG/ML

## 2024-08-20 ENCOUNTER — APPOINTMENT (OUTPATIENT)
Dept: PULMONOLOGY | Facility: CLINIC | Age: 59
End: 2024-08-20
Payer: COMMERCIAL

## 2024-08-20 VITALS
HEIGHT: 70 IN | BODY MASS INDEX: 28.63 KG/M2 | WEIGHT: 200 LBS | DIASTOLIC BLOOD PRESSURE: 100 MMHG | SYSTOLIC BLOOD PRESSURE: 200 MMHG | OXYGEN SATURATION: 97 % | HEART RATE: 76 BPM

## 2024-08-20 DIAGNOSIS — R06.83 SNORING: ICD-10-CM

## 2024-08-20 PROCEDURE — 99204 OFFICE O/P NEW MOD 45 MIN: CPT

## 2024-08-20 NOTE — ASSESSMENT
[FreeTextEntry1] : Patient with history of severe hypertension, history of snoring and witnessed apneas.  Patient will require a home sleep study to confirm a diagnosis of sleep apnea.  If this study is not conclusive I would will order an in lab study.

## 2024-08-20 NOTE — HISTORY OF PRESENT ILLNESS
[TextBox_4] : 59-year-old male with a history of severe hypertension with multiple sensitivities to various antihypertensives.  He currently is not on any antihypertensives.  He has had extensive workup regarding the etiology of his hypertension.  He is known to snore but used to snore more loudly when he was drinking more heavily.  He now drinks 1 glass of wine per night and he does not think the snoring is is very loud.  However his wife continues to note that he stops breathing.  His bedtime is 10 PM and he awakens at 6 AM.  He usually sleeps through the night although occasionally gets up once tonight once per night to urinate.  He is somewhat tired during the day and does take 10-minute naps x 2 during the day.  His [place] score is 8.  He is 5 feet 11 weight 200.  His weight has been stable.  He does occasionally wake up from naps with a loud snore.  Past medical history significant for severe hypertension and sinus congestion.  He is on no current medications.  He drinks 1 glass of wine per night.  He is a non-smoker.

## 2024-08-21 ENCOUNTER — APPOINTMENT (OUTPATIENT)
Dept: NEPHROLOGY | Facility: CLINIC | Age: 59
End: 2024-08-21
Payer: COMMERCIAL

## 2024-08-21 VITALS
OXYGEN SATURATION: 99 % | HEART RATE: 78 BPM | WEIGHT: 200 LBS | BODY MASS INDEX: 28.63 KG/M2 | HEIGHT: 70 IN | SYSTOLIC BLOOD PRESSURE: 168 MMHG | DIASTOLIC BLOOD PRESSURE: 114 MMHG

## 2024-08-21 DIAGNOSIS — N28.1 CYST OF KIDNEY, ACQUIRED: ICD-10-CM

## 2024-08-21 DIAGNOSIS — N20.0 CALCULUS OF KIDNEY: ICD-10-CM

## 2024-08-21 DIAGNOSIS — I10 ESSENTIAL (PRIMARY) HYPERTENSION: ICD-10-CM

## 2024-08-21 DIAGNOSIS — D75.1 SECONDARY POLYCYTHEMIA: ICD-10-CM

## 2024-08-21 PROCEDURE — 99214 OFFICE O/P EST MOD 30 MIN: CPT

## 2024-08-21 NOTE — REVIEW OF SYSTEMS
[Fever] : no fever [Chills] : no chills [Chest Pain] : no chest pain [Palpitations] : no palpitations [Cough] : no cough [SOB on Exertion] : no shortness of breath during exertion [Constipation] : no constipation [Diarrhea] : no diarrhea [Dysuria] : no dysuria [Hesitancy] : no urinary hesitancy [Dizziness] : no dizziness

## 2024-08-21 NOTE — HISTORY OF PRESENT ILLNESS
[FreeTextEntry1] : Has hx of HTN, kidney stones s//p removal, anxiety. He was diagnosed with HTN in 2005 then uncontrolled since 2016.  He had seen nephrologist before in 2016 in [place], stated that work up was not conclusive.  He also had cardiology evaluation BP today 230/120, he is not on any medication for the past 3 years. Lowest /92, highest 247/140. Denies any symptoms. Reported lowest home bp 170s/100s. He reported side effect with most of ani hypertensive classes.   Anaphylactic reaction (described as palpitation, high bp, generalized cold sensation/pain/tingling, shooting pain in the arm, weird sensation in the chest), turn red) with: minoxidil, lisinopril, losartan, clonidine, metoprolol, atenolol.  Cough: 8 months after starting chlorthalidone HCTZ: gout Nifedipine: palpitation, sob Diltiazem: dizziness, sob He also tried non-medical management, with healthy diet, paul chi, acupuncture with minimal and transient improved in blood pressure. He reported healthy diet, half vegetarian diet, low salt diet. He very active, 4 times a week martial art and meditate regularly.  Lab reviewed, has polycythemia, JAK2 negative, Epo wnl. Snore at night,  Renal US last year    COMPARISON: 9/15/2022    FINDINGS:    Right kidney: 12.6 cm. No hydronephrosis or calculi. 2.3 cm interpolar cyst.    Left kidney: 13.1 cm. No hydronephrosis or calculi.     Urinary bladder: Within normal limits.     8/21 Secondary HTN work up negative.  Result discussed with patient and all question answered.  BP remained high, discussed medication option with patient to trial low dose cc. He stated that he would prefer the BP to be managed by his PCP whom he has been following for years. Risk of uncontrolled HTN discussed with patient.

## 2024-08-21 NOTE — ASSESSMENT
[FreeTextEntry1] : # HTN, uncontrolled -/114 , asymptomatic. -Not on medication for 3 years due to side effect with most of the anti-hypertensive class -Offered to try different med, starting low dose in the class that he had the side effect but declined treatment. Risk and benefit explained, verbalized understanding. He would prefer his BP to be managed by his PCP Dr. Rose.  - Secondary HTN work up with plasma metanephrine/catecholamin, 24 hr urine metanephrine and -catecholamin, AM cortisol, aldosterone, renin, acth negative.  - Renal US : kidney 12-13cm. 2.3 cm cyst. Small size, asymmetric kidney usually seen with renal artery stenosis.  - Low salt diet  # Hx of Nephrolithiasis # Renal cyst  No stone seen last renal US  -Low sodium diet - Keep good hydration   # Polycythemia -neg EMILI 2  -epo wnl -Last visit referred to Pulmonary for ZAFAR eval. He saw Dr. Holland recently, will have sleep study schedule.   # Anxiety  -Reported saw neurology in the past. Recommended psychiatrist  RTC PRN, cont fu with PCP

## 2024-09-06 ENCOUNTER — APPOINTMENT (OUTPATIENT)
Age: 59
End: 2024-09-06

## 2024-09-06 VITALS
BODY MASS INDEX: 28.63 KG/M2 | OXYGEN SATURATION: 98 % | SYSTOLIC BLOOD PRESSURE: 220 MMHG | HEIGHT: 70 IN | DIASTOLIC BLOOD PRESSURE: 100 MMHG | WEIGHT: 200 LBS | HEART RATE: 69 BPM

## 2024-09-06 DIAGNOSIS — R42 DIZZINESS AND GIDDINESS: ICD-10-CM

## 2024-09-06 PROCEDURE — 99214 OFFICE O/P EST MOD 30 MIN: CPT

## 2024-09-06 PROCEDURE — G2211 COMPLEX E/M VISIT ADD ON: CPT | Mod: NC

## 2024-09-12 ENCOUNTER — NON-APPOINTMENT (OUTPATIENT)
Age: 59
End: 2024-09-12

## 2024-09-12 RX ORDER — PROPRANOLOL HYDROCHLORIDE 10 MG/1
10 TABLET ORAL
Qty: 30 | Refills: 0 | Status: DISCONTINUED | COMMUNITY
Start: 2024-09-06 | End: 2024-09-12

## 2024-09-12 RX ORDER — CLONIDINE HYDROCHLORIDE 0.1 MG/1
0.1 TABLET ORAL TWICE DAILY
Qty: 60 | Refills: 0 | Status: ACTIVE | COMMUNITY
Start: 2024-09-12 | End: 1900-01-01

## 2024-09-23 RX ORDER — CLONAZEPAM 0.5 MG/1
0.5 TABLET ORAL DAILY
Qty: 30 | Refills: 0 | Status: ACTIVE | COMMUNITY
Start: 2024-09-23 | End: 1900-01-01

## 2024-09-27 RX ORDER — ALPRAZOLAM 0.5 MG/1
0.5 TABLET ORAL
Qty: 30 | Refills: 0 | Status: ACTIVE | COMMUNITY
Start: 2024-09-27 | End: 1900-01-01

## 2024-10-04 ENCOUNTER — APPOINTMENT (OUTPATIENT)
Age: 59
End: 2024-10-04
Payer: COMMERCIAL

## 2024-10-04 DIAGNOSIS — I10 ESSENTIAL (PRIMARY) HYPERTENSION: ICD-10-CM

## 2024-10-04 PROCEDURE — 99213 OFFICE O/P EST LOW 20 MIN: CPT

## 2024-10-04 PROCEDURE — G2211 COMPLEX E/M VISIT ADD ON: CPT | Mod: NC

## 2024-10-07 ENCOUNTER — TRANSCRIPTION ENCOUNTER (OUTPATIENT)
Age: 59
End: 2024-10-07

## 2024-10-07 ENCOUNTER — RESULT REVIEW (OUTPATIENT)
Age: 59
End: 2024-10-07

## 2024-10-07 NOTE — HISTORY OF PRESENT ILLNESS
[Home] : at home, [unfilled] , at the time of the visit. [Medical Office: ([facility])___] : at the medical office located in  [Verbal consent obtained from patient] : the patient, [unfilled] [de-identified] : 58 yo Male with a history of resistant hypertension with multiple interactions with medication Patient has a long list of medications that he has had poor reaction to He is also had hypertensive urgency resulting in hospital visits The source of the elevations in blood pressure have been thought to be related to anxiety  BP- improved since our last visit- taking xanax since last friday  lowers blood pressure- average  185/97- 65  Patient was not able to tolerate clonazepam.  The patient is currently seeing counseling and is awaiting an eval with psychiatry He denies any symptoms of headache chest pain shortness of breath at this time

## 2024-10-07 NOTE — ASSESSMENT
[FreeTextEntry1] : Counseled patient regarding the risks of medication Recommended that he evaluate his case with psychiatry continue the Xanax for now Will set up a follow-up in shorter interval to discussed with the patient new options for blood pressure medication in the event there is no beneficial results from his management with anxiety Recommendations from nephrology did include methyldopa and an evaluation with the allergy clinic to review his reactions to these medications though these reactions appear to be associated with anxiety

## 2024-10-07 NOTE — HISTORY OF PRESENT ILLNESS
[Home] : at home, [unfilled] , at the time of the visit. [Medical Office: ([facility])___] : at the medical office located in  [Verbal consent obtained from patient] : the patient, [unfilled] [de-identified] : 58 yo Male with a history of resistant hypertension with multiple interactions with medication Patient has a long list of medications that he has had poor reaction to He is also had hypertensive urgency resulting in hospital visits The source of the elevations in blood pressure have been thought to be related to anxiety  BP- improved since our last visit- taking xanax since last friday  lowers blood pressure- average  185/97- 65  Patient was not able to tolerate clonazepam.  The patient is currently seeing counseling and is awaiting an eval with psychiatry He denies any symptoms of headache chest pain shortness of breath at this time

## 2024-10-08 ENCOUNTER — TRANSCRIPTION ENCOUNTER (OUTPATIENT)
Age: 59
End: 2024-10-08

## 2024-10-15 DIAGNOSIS — R35.0 FREQUENCY OF MICTURITION: ICD-10-CM

## 2025-01-21 ENCOUNTER — APPOINTMENT (OUTPATIENT)
Dept: DERMATOLOGY | Facility: CLINIC | Age: 60
End: 2025-01-21

## 2025-02-05 ENCOUNTER — APPOINTMENT (OUTPATIENT)
Dept: COLORECTAL SURGERY | Facility: CLINIC | Age: 60
End: 2025-02-05
Payer: COMMERCIAL

## 2025-02-05 VITALS
DIASTOLIC BLOOD PRESSURE: 104 MMHG | BODY MASS INDEX: 28.63 KG/M2 | WEIGHT: 200 LBS | HEIGHT: 70 IN | OXYGEN SATURATION: 100 % | HEART RATE: 89 BPM | SYSTOLIC BLOOD PRESSURE: 204 MMHG

## 2025-02-05 PROCEDURE — 99204 OFFICE O/P NEW MOD 45 MIN: CPT | Mod: 25

## 2025-02-05 PROCEDURE — 46600 DIAGNOSTIC ANOSCOPY SPX: CPT

## 2025-02-07 ENCOUNTER — NON-APPOINTMENT (OUTPATIENT)
Age: 60
End: 2025-02-07

## 2025-02-11 ENCOUNTER — APPOINTMENT (OUTPATIENT)
Dept: SURGERY | Facility: CLINIC | Age: 60
End: 2025-02-11

## 2025-02-20 DIAGNOSIS — D58.2 OTHER HEMOGLOBINOPATHIES: ICD-10-CM

## 2025-02-23 LAB
ALBUMIN SERPL ELPH-MCNC: 4.5 G/DL
ALP BLD-CCNC: 82 U/L
ALT SERPL-CCNC: 17 U/L
ANION GAP SERPL CALC-SCNC: 11 MMOL/L
AST SERPL-CCNC: 16 U/L
BASOPHILS # BLD AUTO: 0.05 K/UL
BASOPHILS NFR BLD AUTO: 1 %
BILIRUB SERPL-MCNC: 0.7 MG/DL
BUN SERPL-MCNC: 13 MG/DL
CALCIUM SERPL-MCNC: 9.8 MG/DL
CHLORIDE SERPL-SCNC: 104 MMOL/L
CO2 SERPL-SCNC: 29 MMOL/L
CREAT SERPL-MCNC: 1.06 MG/DL
EGFR: 80 ML/MIN/1.73M2
EOSINOPHIL # BLD AUTO: 0.1 K/UL
EOSINOPHIL NFR BLD AUTO: 2 %
GLUCOSE SERPL-MCNC: 83 MG/DL
HCT VFR BLD CALC: 45.7 %
HGB BLD-MCNC: 16.8 G/DL
IMM GRANULOCYTES NFR BLD AUTO: 0.2 %
LYMPHOCYTES # BLD AUTO: 1.4 K/UL
LYMPHOCYTES NFR BLD AUTO: 28.6 %
MAN DIFF?: NORMAL
MCHC RBC-ENTMCNC: 31.6 PG
MCHC RBC-ENTMCNC: 36.8 G/DL
MCV RBC AUTO: 86.1 FL
MONOCYTES # BLD AUTO: 0.53 K/UL
MONOCYTES NFR BLD AUTO: 10.8 %
NEUTROPHILS # BLD AUTO: 2.81 K/UL
NEUTROPHILS NFR BLD AUTO: 57.4 %
PLATELET # BLD AUTO: 218 K/UL
POTASSIUM SERPL-SCNC: 4.4 MMOL/L
PROT SERPL-MCNC: 6.9 G/DL
RBC # BLD: 5.31 M/UL
RBC # FLD: 12 %
SODIUM SERPL-SCNC: 143 MMOL/L
WBC # FLD AUTO: 4.9 K/UL

## 2025-02-27 ENCOUNTER — APPOINTMENT (OUTPATIENT)
Dept: GASTROENTEROLOGY | Facility: CLINIC | Age: 60
End: 2025-02-27
Payer: COMMERCIAL

## 2025-02-27 VITALS
HEIGHT: 70 IN | HEART RATE: 75 BPM | OXYGEN SATURATION: 99 % | SYSTOLIC BLOOD PRESSURE: 186 MMHG | BODY MASS INDEX: 27.92 KG/M2 | DIASTOLIC BLOOD PRESSURE: 100 MMHG | WEIGHT: 195 LBS

## 2025-02-27 DIAGNOSIS — K62.89 OTHER SPECIFIED DISEASES OF ANUS AND RECTUM: ICD-10-CM

## 2025-02-27 DIAGNOSIS — K62.5 HEMORRHAGE OF ANUS AND RECTUM: ICD-10-CM

## 2025-02-27 DIAGNOSIS — Z86.0100 PERSONAL HISTORY OF COLON POLYPS, UNSPECIFIED: ICD-10-CM

## 2025-02-27 PROCEDURE — G2211 COMPLEX E/M VISIT ADD ON: CPT | Mod: NC

## 2025-02-27 PROCEDURE — 99204 OFFICE O/P NEW MOD 45 MIN: CPT

## 2025-03-18 ENCOUNTER — APPOINTMENT (OUTPATIENT)
Dept: GASTROENTEROLOGY | Facility: HOSPITAL | Age: 60
End: 2025-03-18

## 2025-04-01 ENCOUNTER — APPOINTMENT (OUTPATIENT)
Dept: FAMILY MEDICINE | Facility: CLINIC | Age: 60
End: 2025-04-01
Payer: COMMERCIAL

## 2025-04-01 DIAGNOSIS — F41.9 ANXIETY DISORDER, UNSPECIFIED: ICD-10-CM

## 2025-04-01 PROCEDURE — 99213 OFFICE O/P EST LOW 20 MIN: CPT | Mod: 95

## 2025-04-17 RX ORDER — SERTRALINE 25 MG/1
25 TABLET, FILM COATED ORAL
Qty: 30 | Refills: 0 | Status: ACTIVE | COMMUNITY
Start: 2025-04-17 | End: 1900-01-01

## 2025-06-03 ENCOUNTER — APPOINTMENT (OUTPATIENT)
Dept: FAMILY MEDICINE | Facility: CLINIC | Age: 60
End: 2025-06-03
Payer: COMMERCIAL

## 2025-06-03 DIAGNOSIS — F41.9 ANXIETY DISORDER, UNSPECIFIED: ICD-10-CM

## 2025-06-03 DIAGNOSIS — I10 ESSENTIAL (PRIMARY) HYPERTENSION: ICD-10-CM

## 2025-06-03 PROCEDURE — G2211 COMPLEX E/M VISIT ADD ON: CPT | Mod: NC,95

## 2025-06-03 PROCEDURE — 99214 OFFICE O/P EST MOD 30 MIN: CPT | Mod: 95

## 2025-07-15 ENCOUNTER — APPOINTMENT (OUTPATIENT)
Age: 60
End: 2025-07-15
Payer: COMMERCIAL

## 2025-07-15 PROCEDURE — 99213 OFFICE O/P EST LOW 20 MIN: CPT | Mod: 95

## 2025-07-15 PROCEDURE — G2211 COMPLEX E/M VISIT ADD ON: CPT | Mod: NC,95
